# Patient Record
Sex: MALE | Race: WHITE | NOT HISPANIC OR LATINO | URBAN - METROPOLITAN AREA
[De-identification: names, ages, dates, MRNs, and addresses within clinical notes are randomized per-mention and may not be internally consistent; named-entity substitution may affect disease eponyms.]

---

## 2020-07-16 ENCOUNTER — OFFICE VISIT (OUTPATIENT)
Dept: URBAN - METROPOLITAN AREA CLINIC 96 | Facility: CLINIC | Age: 50
End: 2020-07-16

## 2020-08-13 ENCOUNTER — DASHBOARD ENCOUNTERS (OUTPATIENT)
Age: 50
End: 2020-08-13

## 2020-08-13 ENCOUNTER — OFFICE VISIT (OUTPATIENT)
Dept: URBAN - METROPOLITAN AREA CLINIC 105 | Facility: CLINIC | Age: 50
End: 2020-08-13
Payer: COMMERCIAL

## 2020-08-13 DIAGNOSIS — Z83.71 FAMILY HISTORY OF COLONIC POLYPS: ICD-10-CM

## 2020-08-13 DIAGNOSIS — K21.0 GERD WITH ESOPHAGITIS: ICD-10-CM

## 2020-08-13 DIAGNOSIS — R13.13 PHARYNGEAL DYSPHAGIA: ICD-10-CM

## 2020-08-13 DIAGNOSIS — R12 WATERBRASH SYMPTOM: ICD-10-CM

## 2020-08-13 DIAGNOSIS — Z80.0 FAMILY HISTORY OF COLON CANCER: ICD-10-CM

## 2020-08-13 PROBLEM — 21101000119105: Status: ACTIVE | Noted: 2020-08-13

## 2020-08-13 PROBLEM — 312824007: Status: ACTIVE | Noted: 2020-08-13

## 2020-08-13 PROBLEM — 429969003: Status: ACTIVE | Noted: 2020-08-13

## 2020-08-13 PROCEDURE — 99204 OFFICE O/P NEW MOD 45 MIN: CPT | Performed by: INTERNAL MEDICINE

## 2020-08-13 NOTE — HPI-OTHER HISTORIES
The pt with history of GERD , Peptic ulcer disease and family history of colon cancer who presents for evaluaton for EGD and colon.  The pt notes that he has a history of PUD and has taken OTC PPI and he notes that he had increased post prandial cought at night that has worsened over the last several months. The pt has a history of consitipation as well which has happenened over the last several years.

## 2020-09-22 ENCOUNTER — TELEPHONE ENCOUNTER (OUTPATIENT)
Dept: URBAN - METROPOLITAN AREA CLINIC 92 | Facility: CLINIC | Age: 50
End: 2020-09-22

## 2020-09-22 ENCOUNTER — OFFICE VISIT (OUTPATIENT)
Dept: URBAN - METROPOLITAN AREA SURGERY CENTER 16 | Facility: SURGERY CENTER | Age: 50
End: 2020-09-22
Payer: COMMERCIAL

## 2020-09-22 DIAGNOSIS — K62.89 ANAL BURNING: ICD-10-CM

## 2020-09-22 DIAGNOSIS — K22.2 ACQUIRED ESOPHAGEAL RING: ICD-10-CM

## 2020-09-22 DIAGNOSIS — K29.40 ATROPHIC GASTRITIS: ICD-10-CM

## 2020-09-22 DIAGNOSIS — Z12.11 COLON CANCER SCREENING: ICD-10-CM

## 2020-09-22 PROBLEM — 40890009: Status: ACTIVE | Noted: 2020-09-22

## 2020-09-22 PROBLEM — 45979003: Status: ACTIVE | Noted: 2020-09-22

## 2020-09-22 PROBLEM — 271832001: Status: ACTIVE | Noted: 2020-09-22

## 2020-09-22 PROBLEM — 162030005: Status: ACTIVE | Noted: 2020-08-13

## 2020-09-22 PROBLEM — 266433003: Status: ACTIVE | Noted: 2020-08-13

## 2020-09-22 PROBLEM — 271834000: Status: ACTIVE | Noted: 2020-09-22

## 2020-09-22 PROCEDURE — 45380 COLONOSCOPY AND BIOPSY: CPT | Performed by: INTERNAL MEDICINE

## 2020-09-22 PROCEDURE — G8907 PT DOC NO EVENTS ON DISCHARG: HCPCS | Performed by: INTERNAL MEDICINE

## 2020-09-22 PROCEDURE — 43239 EGD BIOPSY SINGLE/MULTIPLE: CPT | Performed by: INTERNAL MEDICINE

## 2020-09-22 PROCEDURE — 43249 ESOPH EGD DILATION <30 MM: CPT | Performed by: INTERNAL MEDICINE

## 2020-09-22 PROCEDURE — G9935 CANC NOT DETECTD DURING SRCN: HCPCS | Performed by: INTERNAL MEDICINE

## 2020-09-22 RX ORDER — PANTOPRAZOLE SODIUM 40 MG/1
1 TABLET TABLET, DELAYED RELEASE ORAL ONCE A DAY
Qty: 90 TABLET | Refills: 3 | OUTPATIENT
Start: 2020-09-22

## 2021-05-04 ENCOUNTER — TELEPHONE ENCOUNTER (OUTPATIENT)
Dept: URBAN - METROPOLITAN AREA CLINIC 40 | Facility: CLINIC | Age: 51
End: 2021-05-04